# Patient Record
Sex: FEMALE | Race: WHITE
[De-identification: names, ages, dates, MRNs, and addresses within clinical notes are randomized per-mention and may not be internally consistent; named-entity substitution may affect disease eponyms.]

---

## 2018-12-01 ENCOUNTER — HOSPITAL ENCOUNTER (OUTPATIENT)
Dept: HOSPITAL 53 - M ADAMS | Age: 48
End: 2018-12-01
Attending: PHYSICIAN ASSISTANT
Payer: COMMERCIAL

## 2018-12-01 ENCOUNTER — HOSPITAL ENCOUNTER (OUTPATIENT)
Dept: HOSPITAL 53 - M SFHCADAM | Age: 48
End: 2018-12-01
Attending: PHYSICIAN ASSISTANT
Payer: COMMERCIAL

## 2018-12-01 DIAGNOSIS — Z83.3: ICD-10-CM

## 2018-12-01 DIAGNOSIS — K52.9: Primary | ICD-10-CM

## 2018-12-01 DIAGNOSIS — Z13.220: ICD-10-CM

## 2018-12-01 DIAGNOSIS — E04.1: ICD-10-CM

## 2018-12-01 DIAGNOSIS — Z13.220: Primary | ICD-10-CM

## 2018-12-01 DIAGNOSIS — K52.9: ICD-10-CM

## 2018-12-01 LAB
ALBUMIN/GLOBULIN RATIO: 1 (ref 1–1.93)
ALBUMIN: 3.7 GM/DL (ref 3.2–5.2)
ALKALINE PHOSPHATASE: 106 U/L (ref 45–117)
ALT SERPL W P-5'-P-CCNC: 24 U/L (ref 12–78)
ANION GAP: 6 MEQ/L (ref 8–16)
AST SERPL-CCNC: 12 U/L (ref 7–37)
BILIRUBIN,TOTAL: 0.3 MG/DL (ref 0.2–1)
BLOOD UREA NITROGEN: 13 MG/DL (ref 7–18)
CALCIUM LEVEL: 8.9 MG/DL (ref 8.5–10.1)
CARBON DIOXIDE LEVEL: 31 MEQ/L (ref 21–32)
CHLORIDE LEVEL: 104 MEQ/L (ref 98–107)
CHOLEST SERPL-MCNC: 224 MG/DL (ref ?–200)
CHOLESTEROL RISK RATIO: 4.15 (ref ?–5)
CREATININE FOR GFR: 0.76 MG/DL (ref 0.55–1.3)
FREE T4: 1.09 NG/DL (ref 0.76–1.46)
GFR SERPL CREATININE-BSD FRML MDRD: > 60 ML/MIN/{1.73_M2} (ref 58–?)
GLUCOSE, FASTING: 85 MG/DL (ref 70–100)
HDLC SERPL-MCNC: 54 MG/DL (ref 40–?)
LDL CHOLESTEROL: 146 MG/DL (ref ?–100)
NONHDLC SERPL-MCNC: 170 MG/DL
POTASSIUM SERUM: 4.2 MEQ/L (ref 3.5–5.1)
SODIUM LEVEL: 141 MEQ/L (ref 136–145)
THYROID STIMULATING HORMONE: 2.12 UIU/ML (ref 0.36–3.74)
TOTAL PROTEIN: 7.4 GM/DL (ref 6.4–8.2)
TRIGLYCERIDES LEVEL: 118 MG/DL (ref ?–150)

## 2018-12-01 PROCEDURE — 84443 ASSAY THYROID STIM HORMONE: CPT

## 2018-12-05 LAB
IMMUNOGLOBULIN A: 153 MG/DL (ref 87–352)
T-TRANSGLUTAMINASE(TTG) IGA: <2 U/ML (ref 0–3)
T-TRANSGLUTAMINASE(TTG) IGG: <2 U/ML (ref 0–5)

## 2019-02-15 ENCOUNTER — HOSPITAL ENCOUNTER (OUTPATIENT)
Dept: HOSPITAL 53 - M RADPRO | Age: 49
End: 2019-02-15
Attending: OTOLARYNGOLOGY
Payer: COMMERCIAL

## 2019-02-15 DIAGNOSIS — E04.2: ICD-10-CM

## 2019-02-15 DIAGNOSIS — D34: Primary | ICD-10-CM

## 2019-02-15 NOTE — REP
ULTRASOUND-GUIDED RIGHT THYROID BIOPSY

 

The procedure was performed under the direct supervision of Dr. montague.  The images

were reviewed with Dr. montague.

 

Patient has a history of a dominant solid nodule in the mid right lobe measuring

2.8 x 1.5 x 2.0 cm as well as in the a solid nodule in the left lower pole

measuring 6 x 6 x 8 mm.  The nodule in the left thyroid lies directly against the

left carotid artery and the trachea.  This is felt to be in unsafe position for

biopsy. Recommend follow-up ultrasound in 6 months.

 

The risks and benefits of the procedure were explained to the patient and

informed consent was obtained.

 

The right thyroid nodule was localized using ultrasound guidance.  The skin was

prepped and draped in a sterile fashion.  1% lidocaine was used as a local

anesthetic.  Using ultrasound guidance four fine-needle aspirations were obtained

using 25 gauge needles.

 

The patient tolerated the procedure well and there were no immediate

complications.

 

Impression:

Ultrasound-guided right thyroid biopsy. The nodule in the left thyroid lies

directly against the left carotid artery and the trachea.  This is felt to be in

an unsafe position for biopsy. Recommend follow-up ultrasound in 6 months.

 

After the appropriate amount of monitored convalescence the patient was

discharged from the department.

 

 

Reviewed by

BERONICA Ramirez 02/15/2019 03:40 P

Electronically Signed by

Tiago Montague MD 02/15/2019 04:24 P

## 2019-02-15 NOTE — REP
THYROID ULTRASOUND:

 

Real-time sonographic evaluation of the thyroid was performed and compared to

prior study of 12/07/2018. There are similar findings compared to that prior

exam. There is a dominant solid nodule in the mid right lobe 2.8 x 1.5 x 2.0 cm.

An adjacent cyst measures 4 mm. Complex cyst in the right upper pole measures 9

mm. A solid nodule in the left upper pole measures 4 x 2 x 4 mm. There is a solid

nodule in the left lower pole measures 6 x 6 x 8 mm.

 

IMPRESSION:

 

Bilateral cysts and nodules as discussed above, essentially unchanged compared to

the prior ultrasound of 12/07/2018.

 

 

Electronically Signed by

Tiago Montague MD 02/15/2019 04:12 P

## 2019-09-10 ENCOUNTER — HOSPITAL ENCOUNTER (OUTPATIENT)
Dept: HOSPITAL 53 - M RAD | Age: 49
End: 2019-09-10
Attending: OTOLARYNGOLOGY
Payer: COMMERCIAL

## 2019-09-10 DIAGNOSIS — E04.1: Primary | ICD-10-CM

## 2019-09-10 NOTE — REP
THYROID ULTRASOUND:

 

Real-time sonographic evaluation of the thyroid was performed. Both lobes are

slightly prominent in size, right lobe measuring 5.0 x 1.9 x 2.5 cm and left lobe

5.3 x 1.5 x 1.9 cm. Solid appearing nodule in the mid right lobe appears similar

to the prior study 2.2 x 1.7 x 1.5 cm. The cyst in the right upper pole measures

1.2 cm maximally and a cyst in the lower pole measures 3 mm maximally. Solid

nodule in the left upper pole is unchanged  8 x 2 x 5 mm, another in the mid left

lobe 6 x 4 x 4 mm and appears unchanged and another in the left lower pole 8 x 6

x 5 mm, appears unchanged.

 

IMPRESSION:

 

Stable exam.

 

 

Electronically Signed by

Tiago Montague MD 09/11/2019 04:55 P

## 2020-08-17 ENCOUNTER — HOSPITAL ENCOUNTER (OUTPATIENT)
Dept: HOSPITAL 53 - M WHC | Age: 50
End: 2020-08-17
Attending: OTOLARYNGOLOGY
Payer: COMMERCIAL

## 2020-08-17 DIAGNOSIS — E04.2: Primary | ICD-10-CM

## 2020-10-06 NOTE — REP
THYROID SONOGRAPHY



HISTORY: Nontoxic multinodular goiter.



COMPARISON: Sonography from 09/10/2019. This showed multiple thyroid nodules. 



FINDINGS: 

Thyroid isthmus is 3-mm thick. Right lobe dimensions by ultrasound today of 4.3 
x 2.1 x 2.5 cm. Left lobe measures 4.5 x 1.0 x 1.5 cm. Multiple nodules are seen
bilaterally. 



In the right lobe todays imaging demonstrates a 2.7 x 1.7 x 1.9 cm solid 
nodule. This is felt to be unchanged when compared with the 09/10/2019 study. 
There is a cystic nodule in the right lobe 1.5 cm greatest diameter and another 
cystic area in the right lobe of 0.6 cm in greatest diameter. These are felt to 
be unchanged as well. 



On the left today sonography demonstrates a 0.8 x 0.6 x 0.3 cm hypoechoic 
nodule. This is felt to be unchanged. 



IMPRESSION: 

Multinodular goiter pattern. Findings stable from prior study.

MTDD

## 2021-04-01 ENCOUNTER — HOSPITAL ENCOUNTER (OUTPATIENT)
Dept: HOSPITAL 53 - M SFHCADAM | Age: 51
End: 2021-04-01
Attending: PHYSICIAN ASSISTANT
Payer: COMMERCIAL

## 2021-04-01 DIAGNOSIS — R00.2: ICD-10-CM

## 2021-04-01 DIAGNOSIS — R42: Primary | ICD-10-CM

## 2021-04-01 LAB
ALBUMIN SERPL BCG-MCNC: 3.9 GM/DL (ref 3.2–5.2)
ALT SERPL W P-5'-P-CCNC: 21 U/L (ref 12–78)
BASOPHILS # BLD AUTO: 0 10^3/UL (ref 0–0.2)
BASOPHILS NFR BLD AUTO: 0.4 % (ref 0–1)
BILIRUB SERPL-MCNC: 0.2 MG/DL (ref 0.2–1)
BUN SERPL-MCNC: 17 MG/DL (ref 7–18)
CALCIUM SERPL-MCNC: 9.6 MG/DL (ref 8.5–10.1)
CHLORIDE SERPL-SCNC: 106 MEQ/L (ref 98–107)
CO2 SERPL-SCNC: 29 MEQ/L (ref 21–32)
CREAT SERPL-MCNC: 0.67 MG/DL (ref 0.55–1.3)
EOSINOPHIL # BLD AUTO: 0.1 10^3/UL (ref 0–0.5)
EOSINOPHIL NFR BLD AUTO: 0.7 % (ref 0–3)
FOLATE SERPL-MCNC: 15.8 NG/ML
GFR SERPL CREATININE-BSD FRML MDRD: > 60 ML/MIN/{1.73_M2} (ref 58–?)
GLUCOSE SERPL-MCNC: 82 MG/DL (ref 70–100)
HCT VFR BLD AUTO: 44.5 % (ref 36–47)
HGB BLD-MCNC: 13.7 G/DL (ref 12–15.5)
LYMPHOCYTES # BLD AUTO: 1.7 10^3/UL (ref 1.5–5)
LYMPHOCYTES NFR BLD AUTO: 20 % (ref 24–44)
MAGNESIUM SERPL-MCNC: 2.3 MG/DL (ref 1.8–2.4)
MCH RBC QN AUTO: 28 PG (ref 27–33)
MCHC RBC AUTO-ENTMCNC: 30.8 G/DL (ref 32–36.5)
MCV RBC AUTO: 90.8 FL (ref 80–96)
MONOCYTES # BLD AUTO: 0.4 10^3/UL (ref 0–0.8)
MONOCYTES NFR BLD AUTO: 5.1 % (ref 2–8)
NEUTROPHILS # BLD AUTO: 6.1 10^3/UL (ref 1.5–8.5)
NEUTROPHILS NFR BLD AUTO: 73.1 % (ref 36–66)
PLATELET # BLD AUTO: 329 10^3/UL (ref 150–450)
POTASSIUM SERPL-SCNC: 4.9 MEQ/L (ref 3.5–5.1)
PROT SERPL-MCNC: 7.7 GM/DL (ref 6.4–8.2)
RBC # BLD AUTO: 4.9 10^6/UL (ref 4–5.4)
SODIUM SERPL-SCNC: 139 MEQ/L (ref 136–145)
T4 FREE SERPL-MCNC: 1.16 NG/DL (ref 0.76–1.46)
TSH SERPL DL<=0.005 MIU/L-ACNC: 1.25 UIU/ML (ref 0.36–3.74)
VIT B12 SERPL-MCNC: 969 PG/ML
WBC # BLD AUTO: 8.4 10^3/UL (ref 4–10)

## 2021-04-09 ENCOUNTER — HOSPITAL ENCOUNTER (OUTPATIENT)
Dept: HOSPITAL 53 - M EKG | Age: 51
End: 2021-04-09
Attending: PHYSICIAN ASSISTANT
Payer: COMMERCIAL

## 2021-04-09 DIAGNOSIS — R42: Primary | ICD-10-CM

## 2021-04-12 NOTE — HOLTMON
[facility]

                                       

                                       Test Date:    2021

Pat Name:     KAJAL GILLILAND            Department:   

Patient ID:   [id number]                 Room:         -

Gender:       Female                   Technician:   PAXTONJOHNATHANFAMILIA

:          1970               Requested By: NATHANAEL VILLAGOMEZ PA-C

Order Number: [id number]     Reading MD:   Jairo Bae

                           Interpretive Statements

Normal sinus rhythm with a maximum heart of 130 bpm noted at 09:23:53 AM on

the second day and a minimum rate of 48 at 06:15:53 AM (2).  No activity

reported with the maximum heart rate.

No pause.

Very rare premature isolated PACs, including a pair.  No supraventricular

run.

No premature isolated PVCs.

Relatively normal Holter Monitor.

 

Electronically Signed on 2021 16:59:49 EDT by Jairo Bae

## 2021-08-09 ENCOUNTER — HOSPITAL ENCOUNTER (OUTPATIENT)
Dept: HOSPITAL 53 - M RAD | Age: 51
End: 2021-08-09
Attending: OTOLARYNGOLOGY
Payer: COMMERCIAL

## 2021-08-09 DIAGNOSIS — E04.2: Primary | ICD-10-CM

## 2022-02-25 ENCOUNTER — HOSPITAL ENCOUNTER (OUTPATIENT)
Dept: HOSPITAL 53 - M WHC | Age: 52
End: 2022-02-25
Attending: OBSTETRICS & GYNECOLOGY
Payer: COMMERCIAL

## 2022-02-25 DIAGNOSIS — Z12.31: Primary | ICD-10-CM

## 2022-03-11 ENCOUNTER — HOSPITAL ENCOUNTER (OUTPATIENT)
Dept: HOSPITAL 53 - M RAD | Age: 52
End: 2022-03-11
Attending: OTOLARYNGOLOGY
Payer: COMMERCIAL

## 2022-03-11 DIAGNOSIS — E04.2: Primary | ICD-10-CM

## 2022-09-07 ENCOUNTER — HOSPITAL ENCOUNTER (OUTPATIENT)
Dept: HOSPITAL 53 - M ADAMS | Age: 52
End: 2022-09-07
Attending: PHYSICIAN ASSISTANT
Payer: COMMERCIAL

## 2022-09-07 ENCOUNTER — HOSPITAL ENCOUNTER (OUTPATIENT)
Dept: HOSPITAL 53 - M SFHCADAM | Age: 52
End: 2022-09-07
Attending: PHYSICIAN ASSISTANT
Payer: COMMERCIAL

## 2022-09-07 DIAGNOSIS — M19.041: Primary | ICD-10-CM

## 2022-09-07 DIAGNOSIS — Z13.220: ICD-10-CM

## 2022-09-07 DIAGNOSIS — M13.0: Primary | ICD-10-CM

## 2022-09-07 LAB
ALBUMIN SERPL BCG-MCNC: 3.7 GM/DL (ref 3.2–5.2)
ALT SERPL W P-5'-P-CCNC: 20 U/L (ref 12–78)
BASOPHILS # BLD AUTO: 0 10^3/UL (ref 0–0.2)
BASOPHILS NFR BLD AUTO: 0.4 % (ref 0–1)
BILIRUB SERPL-MCNC: 0.1 MG/DL (ref 0.2–1)
BUN SERPL-MCNC: 15 MG/DL (ref 7–18)
CALCIUM SERPL-MCNC: 9.6 MG/DL (ref 8.5–10.1)
CHLORIDE SERPL-SCNC: 103 MEQ/L (ref 98–107)
CHOLEST SERPL-MCNC: 235 MG/DL (ref ?–200)
CHOLEST/HDLC SERPL: 4.43 {RATIO} (ref ?–5)
CO2 SERPL-SCNC: 26 MEQ/L (ref 21–32)
CREAT SERPL-MCNC: 0.84 MG/DL (ref 0.55–1.3)
CRP SERPL-MCNC: 1.91 MG/DL (ref 0–0.3)
EOSINOPHIL # BLD AUTO: 0.1 10^3/UL (ref 0–0.5)
EOSINOPHIL NFR BLD AUTO: 0.8 % (ref 0–3)
ERYTHROCYTE [SEDIMENTATION RATE] IN BLOOD BY WESTERGREN METHOD: 58 MM/HR (ref 0–20)
GFR SERPL CREATININE-BSD FRML MDRD: > 60 ML/MIN/{1.73_M2} (ref 58–?)
GLUCOSE SERPL-MCNC: 97 MG/DL (ref 70–100)
HCT VFR BLD AUTO: 41.4 % (ref 36–47)
HDLC SERPL-MCNC: 53 MG/DL (ref 40–?)
HGB BLD-MCNC: 13.1 G/DL (ref 12–15.5)
LDLC SERPL CALC-MCNC: 161 MG/DL (ref ?–100)
LYMPHOCYTES # BLD AUTO: 2.7 10^3/UL (ref 1.5–5)
LYMPHOCYTES NFR BLD AUTO: 23.4 % (ref 24–44)
MCH RBC QN AUTO: 28.1 PG (ref 27–33)
MCHC RBC AUTO-ENTMCNC: 31.6 G/DL (ref 32–36.5)
MCV RBC AUTO: 88.7 FL (ref 80–96)
MONOCYTES # BLD AUTO: 0.6 10^3/UL (ref 0–0.8)
MONOCYTES NFR BLD AUTO: 5.2 % (ref 2–8)
NEUTROPHILS # BLD AUTO: 7.9 10^3/UL (ref 1.5–8.5)
NEUTROPHILS NFR BLD AUTO: 69.6 % (ref 36–66)
NONHDLC SERPL-MCNC: 182 MG/DL
PLATELET # BLD AUTO: 352 10^3/UL (ref 150–450)
POTASSIUM SERPL-SCNC: 4.1 MEQ/L (ref 3.5–5.1)
PROT SERPL-MCNC: 7.5 GM/DL (ref 6.4–8.2)
RBC # BLD AUTO: 4.67 10^6/UL (ref 4–5.4)
RHEUMATOID FACT SERPL-ACNC: < 10 IU/ML (ref ?–15)
SODIUM SERPL-SCNC: 136 MEQ/L (ref 136–145)
T4 FREE SERPL-MCNC: 1.12 NG/DL (ref 0.76–1.46)
TRIGL SERPL-MCNC: 105 MG/DL (ref ?–150)
TSH SERPL DL<=0.005 MIU/L-ACNC: 1.41 UIU/ML (ref 0.36–3.74)
WBC # BLD AUTO: 11.3 10^3/UL (ref 4–10)

## 2022-09-09 LAB
ANA INTERCELL BRIDGE TITR SER IF: (no result) {TITER}
ANA NUCLEAR DOTS TITR SER IF: (no result) {TITER}
ANA NUCLEAR MEMBRANE PORES TITR SER IF: (no result) {TITER}
DEPRECATED CENTRIOLE AB TITR SER IF: (no result) {TITER}
ENA PCNA AB TITR SER IF: (no result) {TITER}
MITOTIC SPINDLE APP AB TITR SERPL IF: (no result) {TITER}

## 2022-09-12 ENCOUNTER — HOSPITAL ENCOUNTER (OUTPATIENT)
Dept: HOSPITAL 53 - M RAD | Age: 52
End: 2022-09-12
Attending: OTOLARYNGOLOGY
Payer: COMMERCIAL

## 2022-09-12 DIAGNOSIS — E04.2: Primary | ICD-10-CM

## 2022-10-18 ENCOUNTER — HOSPITAL ENCOUNTER (OUTPATIENT)
Dept: HOSPITAL 53 - M SFHCRHEU | Age: 52
End: 2022-10-18
Attending: INTERNAL MEDICINE
Payer: COMMERCIAL

## 2022-10-18 DIAGNOSIS — M79.10: ICD-10-CM

## 2022-10-18 DIAGNOSIS — R79.82: ICD-10-CM

## 2022-10-18 DIAGNOSIS — R76.8: ICD-10-CM

## 2022-10-18 DIAGNOSIS — R70.0: Primary | ICD-10-CM

## 2022-10-18 LAB
25(OH)D3 SERPL-MCNC: 16 NG/ML (ref 30–100)
APPEARANCE UR: CLEAR
BASOPHILS # BLD AUTO: 0.1 10^3/UL (ref 0–0.2)
BASOPHILS NFR BLD AUTO: 0.4 % (ref 0–1)
BILIRUB UR QL STRIP: NEGATIVE
C3 SERPL-MCNC: 209 MG/DL (ref 90–180)
C4 SERPL-MCNC: 60 MG/DL (ref 10–40)
COLOR UR: YELLOW
CREAT UR-MCNC: 27.2 MG/DL
CRP SERPL-MCNC: 1.78 MG/DL (ref 0–0.3)
EOSINOPHIL # BLD AUTO: 0.1 10^3/UL (ref 0–0.5)
EOSINOPHIL NFR BLD AUTO: 0.4 % (ref 0–3)
ERYTHROCYTE [SEDIMENTATION RATE] IN BLOOD BY WESTERGREN METHOD: 59 MM/HR (ref 0–30)
GLUCOSE UR STRIP-MCNC: NEGATIVE MG/DL
HCT VFR BLD AUTO: 41.3 % (ref 36–47)
HGB BLD-MCNC: 13.1 G/DL (ref 12–15.5)
HGB UR QL STRIP: NEGATIVE
IRON SERPL-MCNC: 38 UG/DL (ref 50–170)
KETONES UR QL STRIP: NEGATIVE MG/DL
LEUKOCYTE ESTERASE UR QL STRIP: NEGATIVE
LYMPHOCYTES # BLD AUTO: 2.5 10^3/UL (ref 1.5–5)
LYMPHOCYTES NFR BLD AUTO: 22.1 % (ref 24–44)
MAGNESIUM SERPL-MCNC: 2.3 MG/DL (ref 1.8–2.4)
MCH RBC QN AUTO: 28.1 PG (ref 27–33)
MCHC RBC AUTO-ENTMCNC: 31.7 G/DL (ref 32–36.5)
MCV RBC AUTO: 88.6 FL (ref 80–96)
MONOCYTES # BLD AUTO: 0.5 10^3/UL (ref 0–0.8)
MONOCYTES NFR BLD AUTO: 4.7 % (ref 2–8)
NEUTROPHILS # BLD AUTO: 8 10^3/UL (ref 1.5–8.5)
NEUTROPHILS NFR BLD AUTO: 71.9 % (ref 36–66)
NITRITE UR QL STRIP: NEGATIVE
PH UR STRIP: 5 UNITS (ref 5–7)
PHOSPHATE SERPL-MCNC: 3.6 MG/DL (ref 2.5–4.9)
PLATELET # BLD AUTO: 342 10^3/UL (ref 150–450)
PROT UR STRIP-MCNC: NEGATIVE MG/DL
PROT UR-MCNC: < 5 MG/DL (ref 0–12)
RBC # BLD AUTO: 4.66 10^6/UL (ref 4–5.4)
SP GR UR STRIP: 1.01 (ref 1–1.03)
UROBILINOGEN UR QL STRIP: NORMAL MG/DL
VIT B12 SERPL-MCNC: 433 PG/ML (ref 247–911)
WBC # BLD AUTO: 11.2 10^3/UL (ref 4–10)

## 2022-10-20 ENCOUNTER — HOSPITAL ENCOUNTER (OUTPATIENT)
Dept: HOSPITAL 53 - M ADAMS | Age: 52
End: 2022-10-20
Attending: INTERNAL MEDICINE
Payer: COMMERCIAL

## 2022-10-20 DIAGNOSIS — M25.50: Primary | ICD-10-CM

## 2022-10-20 DIAGNOSIS — M18.2: ICD-10-CM

## 2023-02-20 ENCOUNTER — HOSPITAL ENCOUNTER (OUTPATIENT)
Dept: HOSPITAL 53 - M RAD | Age: 53
End: 2023-02-20
Attending: OTOLARYNGOLOGY
Payer: COMMERCIAL

## 2023-02-20 DIAGNOSIS — E04.2: Primary | ICD-10-CM

## 2023-02-21 ENCOUNTER — HOSPITAL ENCOUNTER (OUTPATIENT)
Dept: HOSPITAL 53 - M SFHCADAM | Age: 53
End: 2023-02-21
Attending: INTERNAL MEDICINE
Payer: COMMERCIAL

## 2023-02-21 DIAGNOSIS — E55.9: Primary | ICD-10-CM

## 2023-03-06 ENCOUNTER — HOSPITAL ENCOUNTER (OUTPATIENT)
Dept: HOSPITAL 53 - M WHC | Age: 53
End: 2023-03-06
Attending: PHYSICIAN ASSISTANT
Payer: COMMERCIAL

## 2023-03-06 DIAGNOSIS — Z12.31: Primary | ICD-10-CM

## 2023-03-15 ENCOUNTER — HOSPITAL ENCOUNTER (OUTPATIENT)
Dept: HOSPITAL 53 - M IRPRO | Age: 53
End: 2023-03-15
Attending: OTOLARYNGOLOGY
Payer: COMMERCIAL

## 2023-03-15 VITALS — DIASTOLIC BLOOD PRESSURE: 95 MMHG | SYSTOLIC BLOOD PRESSURE: 159 MMHG

## 2023-03-15 DIAGNOSIS — E04.2: Primary | ICD-10-CM

## 2023-03-21 ENCOUNTER — HOSPITAL ENCOUNTER (OUTPATIENT)
Dept: HOSPITAL 53 - M LABDRWAD | Age: 53
End: 2023-03-21
Attending: INTERNAL MEDICINE
Payer: COMMERCIAL

## 2023-03-21 DIAGNOSIS — E61.1: Primary | ICD-10-CM

## 2023-03-31 ENCOUNTER — HOSPITAL ENCOUNTER (OUTPATIENT)
Dept: HOSPITAL 53 - M SFHCADAM | Age: 53
End: 2023-03-31
Attending: PHYSICIAN ASSISTANT
Payer: COMMERCIAL

## 2023-03-31 DIAGNOSIS — K21.9: ICD-10-CM

## 2023-03-31 DIAGNOSIS — Z87.19: ICD-10-CM

## 2023-03-31 DIAGNOSIS — R10.13: Primary | ICD-10-CM

## 2023-03-31 LAB
ALBUMIN SERPL BCG-MCNC: 3.8 G/DL (ref 3.2–5.2)
ALP SERPL-CCNC: 109 U/L (ref 46–116)
ALT SERPL W P-5'-P-CCNC: 31 U/L (ref 7–40)
AMYLASE SERPL-CCNC: 57 U/L (ref 30–118)
AST SERPL-CCNC: 21 U/L (ref ?–34)
BASOPHILS # BLD AUTO: 0.1 10^3/UL (ref 0–0.2)
BASOPHILS NFR BLD AUTO: 0.8 % (ref 0–1)
BILIRUB SERPL-MCNC: 0.2 MG/DL (ref 0.3–1.2)
BUN SERPL-MCNC: 20 MG/DL (ref 9–23)
CALCIUM SERPL-MCNC: 9.6 MG/DL (ref 8.5–10.1)
CHLORIDE SERPL-SCNC: 104 MMOL/L (ref 98–107)
CO2 SERPL-SCNC: 27 MMOL/L (ref 20–31)
CREAT SERPL-MCNC: 0.84 MG/DL (ref 0.55–1.3)
EOSINOPHIL # BLD AUTO: 0.4 10^3/UL (ref 0–0.5)
EOSINOPHIL NFR BLD AUTO: 4.3 % (ref 0–3)
GFR SERPL CREATININE-BSD FRML MDRD: > 60 ML/MIN/{1.73_M2} (ref 51–?)
GLUCOSE SERPL-MCNC: 91 MG/DL (ref 60–100)
HCT VFR BLD AUTO: 42.5 % (ref 36–47)
HGB BLD-MCNC: 13.1 G/DL (ref 12–15.5)
LIPASE SERPL-CCNC: 40 U/L (ref 12–53)
LYMPHOCYTES # BLD AUTO: 1.5 10^3/UL (ref 1.5–5)
LYMPHOCYTES NFR BLD AUTO: 17.4 % (ref 24–44)
MCH RBC QN AUTO: 27.4 PG (ref 27–33)
MCHC RBC AUTO-ENTMCNC: 30.8 G/DL (ref 32–36.5)
MCV RBC AUTO: 88.9 FL (ref 80–96)
MONOCYTES # BLD AUTO: 0.6 10^3/UL (ref 0–0.8)
MONOCYTES NFR BLD AUTO: 7.2 % (ref 2–8)
NEUTROPHILS # BLD AUTO: 6.1 10^3/UL (ref 1.5–8.5)
NEUTROPHILS NFR BLD AUTO: 70 % (ref 36–66)
PLATELET # BLD AUTO: 340 10^3/UL (ref 150–450)
POTASSIUM SERPL-SCNC: 4.4 MMOL/L (ref 3.5–5.1)
PROT SERPL-MCNC: 7.3 G/DL (ref 5.7–8.2)
RBC # BLD AUTO: 4.78 10^6/UL (ref 4–5.4)
SODIUM SERPL-SCNC: 139 MMOL/L (ref 136–145)
WBC # BLD AUTO: 8.8 10^3/UL (ref 4–10)

## 2023-04-28 ENCOUNTER — HOSPITAL ENCOUNTER (OUTPATIENT)
Dept: HOSPITAL 53 - M RAD | Age: 53
End: 2023-04-28
Attending: PHYSICIAN ASSISTANT
Payer: COMMERCIAL

## 2023-04-28 DIAGNOSIS — Z90.49: ICD-10-CM

## 2023-04-28 DIAGNOSIS — K44.9: Primary | ICD-10-CM

## 2023-04-28 PROCEDURE — 74178 CT ABD&PLV WO CNTR FLWD CNTR: CPT

## 2023-04-29 ENCOUNTER — HOSPITAL ENCOUNTER (OUTPATIENT)
Dept: HOSPITAL 53 - M RAD | Age: 53
End: 2023-04-29
Attending: PHYSICIAN ASSISTANT
Payer: COMMERCIAL

## 2023-04-29 DIAGNOSIS — Z53.8: ICD-10-CM

## 2023-04-29 DIAGNOSIS — R10.31: Primary | ICD-10-CM

## 2023-09-06 ENCOUNTER — HOSPITAL ENCOUNTER (OUTPATIENT)
Dept: HOSPITAL 53 - M RAD | Age: 53
End: 2023-09-06
Attending: OTOLARYNGOLOGY
Payer: COMMERCIAL

## 2023-09-06 DIAGNOSIS — E04.2: Primary | ICD-10-CM

## 2023-09-29 ENCOUNTER — HOSPITAL ENCOUNTER (EMERGENCY)
Dept: HOSPITAL 53 - M ED | Age: 53
LOS: 1 days | Discharge: HOME | End: 2023-09-30
Payer: COMMERCIAL

## 2023-09-29 VITALS — WEIGHT: 212.08 LBS | BODY MASS INDEX: 40.04 KG/M2 | HEIGHT: 61 IN

## 2023-09-29 DIAGNOSIS — I10: Primary | ICD-10-CM

## 2023-09-29 DIAGNOSIS — Z79.899: ICD-10-CM

## 2023-09-30 VITALS — OXYGEN SATURATION: 99 % | SYSTOLIC BLOOD PRESSURE: 150 MMHG | TEMPERATURE: 97.9 F | DIASTOLIC BLOOD PRESSURE: 84 MMHG

## 2023-09-30 LAB
APPEARANCE UR: CLEAR
BACTERIA UR QL AUTO: NEGATIVE
BILIRUB UR QL STRIP.AUTO: NEGATIVE
GLUCOSE UR QL STRIP.AUTO: NEGATIVE MG/DL
HGB UR QL STRIP.AUTO: NEGATIVE
KETONES UR QL STRIP.AUTO: (no result) MG/DL
LEUKOCYTE ESTERASE UR QL STRIP.AUTO: NEGATIVE
MUCOUS THREADS URNS QL MICRO: (no result)
NITRITE UR QL STRIP.AUTO: NEGATIVE
PH UR STRIP.AUTO: 6 UNITS (ref 5–9)
PROT UR QL STRIP.AUTO: (no result) MG/DL
RBC # UR AUTO: 1 /HPF (ref 0–3)
SP GR UR STRIP.AUTO: 1.03 (ref 1–1.03)
SQUAMOUS #/AREA URNS AUTO: 3 /HPF (ref 0–6)
UROBILINOGEN UR QL STRIP.AUTO: 0.2 MG/DL (ref 0–2)
WBC #/AREA URNS AUTO: 2 /HPF (ref 0–3)

## 2023-10-05 ENCOUNTER — HOSPITAL ENCOUNTER (OUTPATIENT)
Dept: HOSPITAL 53 - M ADAMS | Age: 53
End: 2023-10-05
Attending: INTERNAL MEDICINE
Payer: COMMERCIAL

## 2023-10-05 DIAGNOSIS — M25.512: ICD-10-CM

## 2023-10-05 DIAGNOSIS — M25.511: ICD-10-CM

## 2023-10-05 DIAGNOSIS — M75.82: Primary | ICD-10-CM

## 2023-10-09 ENCOUNTER — HOSPITAL ENCOUNTER (OUTPATIENT)
Dept: HOSPITAL 53 - M SFHCRHEU | Age: 53
End: 2023-10-09
Attending: INTERNAL MEDICINE
Payer: COMMERCIAL

## 2023-10-09 DIAGNOSIS — R70.0: ICD-10-CM

## 2023-10-09 DIAGNOSIS — E61.1: Primary | ICD-10-CM

## 2023-10-09 DIAGNOSIS — E55.9: ICD-10-CM

## 2023-10-09 DIAGNOSIS — R79.82: ICD-10-CM

## 2023-10-09 LAB
25(OH)D3 SERPL-MCNC: 39.6 NG/ML (ref 20–100)
CRP SERPL-MCNC: 1.8 MG/DL (ref ?–1)
IRON SERPL-MCNC: 31 UG/DL (ref 50–170)

## 2023-12-15 ENCOUNTER — HOSPITAL ENCOUNTER (OUTPATIENT)
Dept: HOSPITAL 53 - M LAB REF | Age: 53
End: 2023-12-15
Attending: PHYSICIAN ASSISTANT
Payer: COMMERCIAL

## 2023-12-15 DIAGNOSIS — R30.0: Primary | ICD-10-CM

## 2024-01-26 ENCOUNTER — HOSPITAL ENCOUNTER (OUTPATIENT)
Dept: HOSPITAL 53 - M SFHCADAM | Age: 54
End: 2024-01-26
Attending: INTERNAL MEDICINE
Payer: COMMERCIAL

## 2024-01-26 DIAGNOSIS — M65.20: Primary | ICD-10-CM

## 2024-01-26 DIAGNOSIS — E61.1: ICD-10-CM

## 2024-01-26 LAB
BASOPHILS # BLD AUTO: 0 10^3/UL (ref 0–0.2)
BASOPHILS NFR BLD AUTO: 0.4 % (ref 0–1)
BUN SERPL-MCNC: 15 MG/DL (ref 9–23)
CALCIUM SERPL-MCNC: 9.1 MG/DL (ref 8.5–10.1)
CHLORIDE SERPL-SCNC: 106 MMOL/L (ref 98–107)
CO2 SERPL-SCNC: 28 MMOL/L (ref 20–31)
CREAT SERPL-MCNC: 0.8 MG/DL (ref 0.55–1.3)
EOSINOPHIL # BLD AUTO: 0.1 10^3/UL (ref 0–0.5)
EOSINOPHIL NFR BLD AUTO: 0.9 % (ref 0–3)
GFR SERPL CREATININE-BSD FRML MDRD: > 60 ML/MIN/{1.73_M2} (ref 51–?)
GLUCOSE SERPL-MCNC: 90 MG/DL (ref 60–100)
HCT VFR BLD AUTO: 42.7 % (ref 36–47)
HGB BLD-MCNC: 13.4 G/DL (ref 12–15.5)
IRON SATN MFR SERPL: 11.6 % (ref 13.2–45)
IRON SERPL-MCNC: 42 UG/DL (ref 50–170)
LYMPHOCYTES # BLD AUTO: 2.2 10^3/UL (ref 1.5–5)
LYMPHOCYTES NFR BLD AUTO: 25.7 % (ref 24–44)
MCH RBC QN AUTO: 27.2 PG (ref 27–33)
MCHC RBC AUTO-ENTMCNC: 31.4 G/DL (ref 32–36.5)
MCV RBC AUTO: 86.6 FL (ref 80–96)
MONOCYTES # BLD AUTO: 0.6 10^3/UL (ref 0–0.8)
MONOCYTES NFR BLD AUTO: 6.4 % (ref 2–8)
NEUTROPHILS # BLD AUTO: 5.7 10^3/UL (ref 1.5–8.5)
NEUTROPHILS NFR BLD AUTO: 66.1 % (ref 36–66)
PLATELET # BLD AUTO: 320 10^3/UL (ref 150–450)
POTASSIUM SERPL-SCNC: 4.3 MMOL/L (ref 3.5–5.1)
PTH-INTACT SERPL-MCNC: 74.3 PG/ML (ref 18.5–88)
RBC # BLD AUTO: 4.93 10^6/UL (ref 4–5.4)
SODIUM SERPL-SCNC: 140 MMOL/L (ref 136–145)
TIBC SERPL-MCNC: 361 UG/DL (ref 250–425)
WBC # BLD AUTO: 8.6 10^3/UL (ref 4–10)

## 2024-03-08 ENCOUNTER — HOSPITAL ENCOUNTER (OUTPATIENT)
Dept: HOSPITAL 53 - M WHC | Age: 54
End: 2024-03-08
Attending: OBSTETRICS & GYNECOLOGY
Payer: COMMERCIAL

## 2024-03-08 ENCOUNTER — HOSPITAL ENCOUNTER (OUTPATIENT)
Dept: HOSPITAL 53 - M RAD | Age: 54
End: 2024-03-08
Attending: OTOLARYNGOLOGY
Payer: COMMERCIAL

## 2024-03-08 DIAGNOSIS — Z12.31: Primary | ICD-10-CM

## 2024-03-08 DIAGNOSIS — E04.2: Primary | ICD-10-CM
